# Patient Record
Sex: MALE | Race: OTHER | NOT HISPANIC OR LATINO
[De-identification: names, ages, dates, MRNs, and addresses within clinical notes are randomized per-mention and may not be internally consistent; named-entity substitution may affect disease eponyms.]

---

## 2023-01-12 ENCOUNTER — NON-APPOINTMENT (OUTPATIENT)
Age: 33
End: 2023-01-12

## 2023-01-12 ENCOUNTER — APPOINTMENT (OUTPATIENT)
Dept: GASTROENTEROLOGY | Facility: CLINIC | Age: 33
End: 2023-01-12
Payer: MEDICAID

## 2023-01-12 VITALS
HEART RATE: 73 BPM | WEIGHT: 274 LBS | BODY MASS INDEX: 40.58 KG/M2 | SYSTOLIC BLOOD PRESSURE: 136 MMHG | DIASTOLIC BLOOD PRESSURE: 82 MMHG | HEIGHT: 69 IN | TEMPERATURE: 97.6 F

## 2023-01-12 DIAGNOSIS — Z78.9 OTHER SPECIFIED HEALTH STATUS: ICD-10-CM

## 2023-01-12 DIAGNOSIS — R10.13 EPIGASTRIC PAIN: ICD-10-CM

## 2023-01-12 DIAGNOSIS — Z83.3 FAMILY HISTORY OF DIABETES MELLITUS: ICD-10-CM

## 2023-01-12 DIAGNOSIS — Z82.49 FAMILY HISTORY OF ISCHEMIC HEART DISEASE AND OTHER DISEASES OF THE CIRCULATORY SYSTEM: ICD-10-CM

## 2023-01-12 PROBLEM — Z00.00 ENCOUNTER FOR PREVENTIVE HEALTH EXAMINATION: Status: ACTIVE | Noted: 2023-01-12

## 2023-01-12 PROCEDURE — 99204 OFFICE O/P NEW MOD 45 MIN: CPT

## 2023-01-12 RX ORDER — FAMOTIDINE 20 MG/1
20 TABLET, FILM COATED ORAL
Refills: 0 | Status: ACTIVE | COMMUNITY

## 2023-01-12 RX ORDER — FLUTICASONE PROPIONATE 50 MCG
50 SPRAY, SUSPENSION NASAL
Refills: 0 | Status: ACTIVE | COMMUNITY

## 2023-01-12 RX ORDER — ALBUTEROL SULFATE 90 UG/1
108 (90 BASE) AEROSOL, METERED RESPIRATORY (INHALATION)
Refills: 0 | Status: ACTIVE | COMMUNITY

## 2023-01-12 RX ORDER — FEXOFENADINE HYDROCHLORIDE 180 MG/1
180 TABLET ORAL
Refills: 0 | Status: ACTIVE | COMMUNITY

## 2023-01-12 RX ORDER — PANTOPRAZOLE SODIUM 40 MG/1
40 GRANULE, DELAYED RELEASE ORAL
Refills: 0 | Status: ACTIVE | COMMUNITY

## 2023-01-12 RX ORDER — SIMETHICONE 40MG/0.6ML
SUSPENSION, DROPS(FINAL DOSAGE FORM)(ML) ORAL
Refills: 0 | Status: ACTIVE | COMMUNITY

## 2023-01-12 RX ORDER — NAPROXEN SODIUM 500 MG/1
500 TABLET, FILM COATED, EXTENDED RELEASE ORAL
Refills: 0 | Status: ACTIVE | COMMUNITY

## 2023-01-12 RX ORDER — SUMATRIPTAN 25 MG/1
25 TABLET, FILM COATED ORAL
Refills: 0 | Status: ACTIVE | COMMUNITY

## 2023-01-12 RX ORDER — PROPRANOLOL HYDROCHLORIDE 20 MG/1
20 TABLET ORAL
Refills: 0 | Status: ACTIVE | COMMUNITY

## 2023-01-12 NOTE — REASON FOR VISIT
[Consultation] : a consultation visit [FreeTextEntry1] : Kindly asked by Dr. Fraga to consult and evaluate patient for                    heartburn\par A copy of this note is being sent to physician requesting consultation.

## 2023-01-12 NOTE — ASSESSMENT
[FreeTextEntry1] : -unclear etiology - some substernal, steady, x>1mo, not prandial or exertional.  Will get report from CT scan done - his upper abd hx is likely significant - possibly recurrent hernia or paraesophageal component.  Will get US to eval biliary possibility.  Increase ppi to BID.  Possible EGD at some point after CT review.\par \par PMD/consultation/hospital notes and Labs/imaging/prior endoscopic results reviewed to extent noted in HPI; and, if procedure code billed on this visit for lab draw, this serves to signify that labs were drawn here in this office.\par

## 2023-01-12 NOTE — HISTORY OF PRESENT ILLNESS
[FreeTextEntry1] : 32m hx obesity, migraines, hiatal hernia repair '19 c/b "internal bleeding" requiring open repair and went home w/ open wound healing, reports 4 wks epigastric/substernal "burning" pain associated w/ n/v w/ ER visit initially 11/2022 - cbc, lipase, cmet WNL - had CT per pt as well reportedly normal, sent home on PPI.  Had subsequent visits to [facility] [place] and again 1/4/2022 w/ normal CBC, CMET, lipase.  No dysphagia. Not prandial.  No wt loss. No change in BM. No fevers.  Felt relief for 6h after lidocaine swallow.  Not exertional.  Still vomits intermittently.  Not keeping him awake.\par \par States h. hernia sx were more sharp and were alleviated significantly w/ initial surgery.  Has avoided his migraine meds since his recent onset (including Naproxen)\par \par Soc:  no tobacco or significant EtOH\par FHx: no FHx GI malignancy or IBD\par \par ROS:\par Constitutional:: no weight loss, fevers\par ENT: no deafness\par Eyes: not blind\par Neck: no LN\par Chest: no dyspnea/cough\par Cardiac: no chest pain\par Vascular: no leg swelling\par GI: no abdominal pain, nausea, vomiting, diarrhea, constipation, rectal bleeding, dysphagia, melena unless otherwise noted in HPI\par : no dysuria, dark urine\par Skin: no rashes, jaundice\par Heme: no bleeding\par Endocrine: no DM unless otherwise stated in HPI\par \par Px: (VS noted below)\par General: NAD, obese.\par Eyes: anicteric\par Oropharynx:  clear\par Neck: no LN\par Chest: normal respiratory effort\par CVS: regular\par Abd: soft, NT, ND, +BS, no HSM; long vertical midline scar\par Ext: no atrophy\par Neuro: grossly nonfocal\par \par Labs/imaging/prior endoscopic results reviewed to the extent available and noted in HPI\par

## 2023-01-12 NOTE — CONSULT LETTER
[FreeTextEntry1] : Dear Dr. DEJUAN VANEGAS ,\par \par I had the pleasure of evaluating your patient,  LUIS LOPEZ.\par \par Please refer to my note below.\par \par Thank you very much for allowing me to participate in the care of this patient.  If you have any questions, please do not hesitate to contact me.\par \par Sincerely, \par \par Juarez Carias MD\par

## 2023-01-18 ENCOUNTER — RESULT REVIEW (OUTPATIENT)
Age: 33
End: 2023-01-18

## 2023-01-27 ENCOUNTER — NON-APPOINTMENT (OUTPATIENT)
Age: 33
End: 2023-01-27

## 2023-01-27 RX ORDER — PANTOPRAZOLE 40 MG/1
40 TABLET, DELAYED RELEASE ORAL
Qty: 30 | Refills: 1 | Status: ACTIVE | COMMUNITY
Start: 2023-01-12 | End: 1900-01-01

## 2023-02-08 RX ORDER — OMEPRAZOLE 40 MG/1
40 CAPSULE, DELAYED RELEASE ORAL
Qty: 30 | Refills: 5 | Status: ACTIVE | COMMUNITY
Start: 2023-02-08 | End: 1900-01-01

## 2023-02-12 ENCOUNTER — RESULT REVIEW (OUTPATIENT)
Age: 33
End: 2023-02-12

## 2023-02-13 ENCOUNTER — RESULT REVIEW (OUTPATIENT)
Age: 33
End: 2023-02-13

## 2023-02-14 ENCOUNTER — APPOINTMENT (OUTPATIENT)
Dept: GASTROENTEROLOGY | Facility: HOSPITAL | Age: 33
End: 2023-02-14

## 2023-02-27 ENCOUNTER — LABORATORY RESULT (OUTPATIENT)
Age: 33
End: 2023-02-27

## 2023-03-18 ENCOUNTER — RESULT REVIEW (OUTPATIENT)
Age: 33
End: 2023-03-18

## 2023-03-20 RX ORDER — OMEPRAZOLE 40 MG/1
40 CAPSULE, DELAYED RELEASE ORAL
Qty: 30 | Refills: 2 | Status: ACTIVE | COMMUNITY
Start: 2023-03-20 | End: 1900-01-01